# Patient Record
Sex: MALE | Race: WHITE | Employment: FULL TIME | URBAN - METROPOLITAN AREA
[De-identification: names, ages, dates, MRNs, and addresses within clinical notes are randomized per-mention and may not be internally consistent; named-entity substitution may affect disease eponyms.]

---

## 2018-06-18 ENCOUNTER — HOSPITAL ENCOUNTER (EMERGENCY)
Facility: CLINIC | Age: 38
Discharge: HOME | End: 2018-06-18
Attending: FAMILY MEDICINE | Admitting: FAMILY MEDICINE
Payer: COMMERCIAL

## 2018-06-18 VITALS
OXYGEN SATURATION: 100 % | BODY MASS INDEX: 23.62 KG/M2 | DIASTOLIC BLOOD PRESSURE: 87 MMHG | WEIGHT: 190 LBS | SYSTOLIC BLOOD PRESSURE: 146 MMHG | RESPIRATION RATE: 18 BRPM | HEIGHT: 75 IN | TEMPERATURE: 98.7 F | HEART RATE: 82 BPM

## 2018-06-18 DIAGNOSIS — H53.419 VISUAL FIELD SCOTOMA, UNSPECIFIED LATERALITY: ICD-10-CM

## 2018-06-18 PROCEDURE — 99284 EMERGENCY DEPT VISIT MOD MDM: CPT | Mod: Z6 | Performed by: FAMILY MEDICINE

## 2018-06-18 PROCEDURE — 99282 EMERGENCY DEPT VISIT SF MDM: CPT | Performed by: FAMILY MEDICINE

## 2018-06-18 NOTE — ED AVS SNAPSHOT
[facility] Emergency Department    [address] [address]    Phone:  [phone]    Fax:  [phone]                                       Jeffery Wood   MRN: [id number]    Department:  [facility] Emergency Department   Date of Visit:  6/18/2018           After Visit Summary Signature Page     I have received my discharge instructions, and my questions have been answered. I have discussed any challenges I see with this plan with the nurse or doctor.    ..........................................................................................................................................  Patient/Patient Representative Signature      ..........................................................................................................................................  Patient Representative Print Name and Relationship to Patient    ..................................................               ................................................  Date                                            Time    ..........................................................................................................................................  Reviewed by Signature/Title    ...................................................              ..............................................  Date                                                            Time

## 2018-06-18 NOTE — ED PROVIDER NOTES
"  History     Chief Complaint   Patient presents with     Eye Problem     pt reports episode of blurred vision L periphery and R inside eye for 30 minutes x 3 days ago, spontaneously resolved.  he denies HA afterwards and denies h/o migraines.  he has been feeling lightheaded since.     HPI  Jeffery Wood is a 37 year old male who presents with an episode of visual field scotoma.  He says that \"I had an aura on Friday.\"  This was 3 days ago.  He shows me a video from the Internet showing visual field scotoma and scintillating scotoma in the right visual field.  He said this lasted about 30 minutes.  After it started within 10 minutes he took some Advil.  It persisted for another 20 minutes.  He never developed a headache.  Since then he has been feeling more fatigued.  He says he has a history of anxiety and has difficulty talking about it and so he is not certain if his fatigue is anxiety related.  He has had some chronic sinus pressure for about the past 2 months since he quit smoking and he had been taking some Tylenol severe sinus medication several months ago but none recently.  He is not taking any current medications.  He is no longer having purulent nasal discharge.  He has tried nasal irrigations.  He has no history of migraine but he does have a history of migraine in the family and his mother.  Says his father has a history of auras.  Aside from anxiety he has not had any significant chronic medical problems.  During the visual symptoms and subsequently he is never had any focal neurologic symptoms of weakness or numbness in the upper or lower extremities or difficulty with his speech, swallowing, or loss of vision.  He has had no incoordination.  He has had no headache at any time.  No nausea or vomiting.  In addition he says    He has difficulty managing his anxiety and asks what the best options are.  I recommended exercise but he said that when he tries to exercise the heavy breathing and increased " "heart rate caused him to have panic attacks.  He is not interested in medication therapy for it.  In particular he does not want to take a benzodiazepine because of the risk for abuse and dependence.    Problem List:    There are no active problems to display for this patient.       Past Medical History:    No past medical history on file.    Past Surgical History:    No past surgical history on file.    Family History:    No family history on file.    Social History:  Marital Status:  Single [1]  Social History   Substance Use Topics     Smoking status: Current Every Day Smoker     Packs/day: 2.00     Years: 7.00     Types: Cigarettes     Smokeless tobacco: Never Used     Alcohol use No        Medications:      LORazepam (ATIVAN) 1 MG tablet         Review of Systems  All other systems are reviewed and are negative    Physical Exam   BP: 146/87  Pulse: 82  Temp: 98.7  F (37.1  C)  Resp: 18  Height: 190.5 cm (6' 3\")  Weight: 86.2 kg (190 lb)  SpO2: 100 %      Physical Exam    Nursing note and vitals were reviewed.  Constitutional: Awake and alert, adequately nourished and developed appearing 37-year-old in no apparent discomfort, who does not appear acutely ill, and who answers questions appropriately and cooperates with examination.  HEENT: PERRLA.  EOMI.   Neck: Freely mobile.  Pulmonary/Chest: Breathing is unlabored.    Abdomen: Soft, nontender, no HSM or masses rebound or guarding.  Musculoskeletal: Extremities are warm and well-perfused and without edema  Neurological: Alert, oriented, thought content logical, coherent.  Cranial nerve testing is normal.  Motor strength is intact in the upper and lower extremities.  Cerebellar testing is normal.  Motor tone is normal.   Skin: Warm, dry, no rashes.  Psychiatric: Affect broad and appropriate.      ED Course     ED Course     Procedures               Critical Care time:  none               No results found for this or any previous visit (from the past 24 " hour(s)).    Medications - No data to display    Assessments & Plan (with Medical Decision Making)     37-year-old male presents with an episode 3 days ago of scintillating scotoma in the visual field.  This lasted about 30 minutes and resolved.  He had taken Advil early in the course of this.  He never developed a headache.  At no time has he had any other neurologic symptoms.  As a result I have low suspicion that this is due to a serious intracranial process and I do not feel he needs advanced imaging at this time.  I suspect this was a migraine aura and he aborted the headache by taking the Advil.  He does have a family history of migraine.  I offered him the option of MRI scanning of the brain but I think it will be unrevealing and he is comfortable deferring on this.  I have advised him to return if he has recurrent symptoms particularly associated with focal neurologic symptoms.    With regard to his chronic anxiety I suggested he establish care in primary care but I think he should consider cardiac rehab to allow him to exercise in a controlled environment so that he can overcome the panic attacks that have been associated with attempts to exercise in the past.  He will schedule follow-up in primary care to discuss this.  He can also talk about medication options but he is reluctant to undertake this and I understand that.    I have reviewed the nursing notes.    I have reviewed the findings, diagnosis, plan and need for follow up with the patient.       Discharge Medication List as of 6/18/2018 11:56 AM          Final diagnoses:   Visual field scotoma, unspecified laterality       6/18/2018   [facility] EMERGENCY DEPARTMENT     Bernardo Alva MD  06/18/18 [id number]

## 2018-06-18 NOTE — DISCHARGE INSTRUCTIONS
I suspect that this was a migraine aura.  You should return to be seen if you have symptoms recur associated with weakness or numbness in an arm or leg, loss of vision, clumsiness, severe headache, or other new concerning symptoms.  For the anxiety, I would recommend following up in primary care and considering cardiac rehab.

## 2018-06-18 NOTE — ED AVS SNAPSHOT
[facility] Emergency Department    [address] [address]    Phone:  [phone]    Fax:  [phone]                                       Jeffery Wood   MRN: [id number]    Department:  [facility] Emergency Department   Date of Visit:  6/18/2018           Patient Information     Date Of Birth          1980        Your diagnoses for this visit were:     Visual field scotoma, unspecified laterality        You were seen by Bernardo Alva MD.        Discharge Instructions       I suspect that this was a migraine aura.  You should return to be seen if you have symptoms recur associated with weakness or numbness in an arm or leg, loss of vision, clumsiness, severe headache, or other new concerning symptoms.  For the anxiety, I would recommend following up in primary care and considering cardiac rehab.    24 Hour Appointment Hotline       To make an appointment at any [facility] clinic, call [phone] ([phone]). If you don't have a family doctor or clinic, we will help you find one. [facility] clinics are conveniently located to serve the needs of you and your family.             Review of your medicines      Our records show that you are taking the medicines listed below. If these are incorrect, please call your family doctor or clinic.        Dose / Directions Last dose taken    LORazepam 1 MG tablet   Commonly known as:  ATIVAN   Dose:  0.5-1 mg   Quantity:  4 tablet        Take 0.5-1 tablets (0.5-1 mg) by mouth every 8 hours as needed for anxiety   Refills:  0                Orders Needing Specimen Collection     None      Pending Results     No orders found from 6/16/2018 to 6/19/2018.            Pending Culture Results     No orders found from 6/16/2018 to 6/19/2018.            Pending Results Instructions     If you had any lab results that were not finalized at the time of your Discharge, you can call the ED Lab Result RN at [phone]. You will be contacted by  "this team for any positive Lab results or changes in treatment. The nurses are available 7 days a week from 10A to 6:30P.  You can leave a message 24 hours per day and they will return your call.        Test Results From Your Hospital Stay               Thank you for choosing [facility]       Thank you for choosing [facility] for your care. Our goal is always to provide you with excellent care. Hearing back from our patients is one way we can continue to improve our services. Please take a few minutes to complete the written survey that you may receive in the mail after you visit with us. Thank you!        NTQ-DataharAWAK Information     Voyando lets you send messages to your doctor, view your test results, renew your prescriptions, schedule appointments and more. To sign up, go to [email or web address] . Click on \"Log in\" on the left side of the screen, which will take you to the Welcome page. Then click on \"Sign up Now\" on the right side of the page.     You will be asked to enter the access code listed below, as well as some personal information. Please follow the directions to create your username and password.     Your access code is: [email or web address]  Expires: 2018 11:56 AM     Your access code will  in 90 days. If you need help or a new code, please call your [facility] clinic or [phone].        Care EveryWhere ID     This is your Care EveryWhere ID. This could be used by other organizations to access your [facility] medical records  [id number]        Equal Access to Services     MARCEL LONG AH: Hadii stephanie Srinivasan, waaxda luqadaha, qaybta kaalmada margie, jose cruz. So [facility] [phone].    ATENCIÓN: Si habla español, tiene a nowak disposición servicios gratuitos de asistencia lingüística. Viviana al [phone].    We comply with applicable federal civil rights laws and [place] laws. We do not discriminate on the basis of race, color, national origin, age, " disability, sex, sexual orientation, or gender identity.            After Visit Summary       This is your record. Keep this with you and show to your community pharmacist(s) and doctor(s) at your next visit.

## 2018-06-18 NOTE — ED NOTES
"Pt was working on Friday and pt has spots in both eyes lasting approximately 30 minutes, pt denied headache.  Pt reports anxiety during episode and worked rest of day.  Pt took ibuprofen.  Saturday, pt felt tired and \"off\"   Sunday pt continued to feel tired and off.   Pt went to work today and pt continues to feel \" off\"   Pt reports feeling worried about episode \"it could just be anxiety\"   Pt does not take anything for anxiety at this time.    "

## 2018-06-19 ENCOUNTER — OFFICE VISIT (OUTPATIENT)
Dept: FAMILY MEDICINE | Facility: CLINIC | Age: 38
End: 2018-06-19
Payer: COMMERCIAL

## 2018-06-19 VITALS
BODY MASS INDEX: 23.87 KG/M2 | TEMPERATURE: 99.2 F | DIASTOLIC BLOOD PRESSURE: 82 MMHG | HEART RATE: 76 BPM | HEIGHT: 75 IN | WEIGHT: 192 LBS | SYSTOLIC BLOOD PRESSURE: 120 MMHG

## 2018-06-19 DIAGNOSIS — Z13.220 LIPID SCREENING: ICD-10-CM

## 2018-06-19 DIAGNOSIS — J01.01 ACUTE RECURRENT MAXILLARY SINUSITIS: Primary | ICD-10-CM

## 2018-06-19 DIAGNOSIS — Z09 FOLLOW-UP FOR RESOLVED CONDITION: ICD-10-CM

## 2018-06-19 PROCEDURE — 99203 OFFICE O/P NEW LOW 30 MIN: CPT | Performed by: NURSE PRACTITIONER

## 2018-06-19 RX ORDER — FLUTICASONE PROPIONATE 50 MCG
1-2 SPRAY, SUSPENSION (ML) NASAL DAILY
Qty: 1 BOTTLE | Refills: 11 | Status: SHIPPED | OUTPATIENT
Start: 2018-06-19

## 2018-06-19 NOTE — MR AVS SNAPSHOT
"              After Visit Summary   6/19/2018    Jeffery Wood    MRN: [id number]           Patient Information     Date Of Birth          1980        Visit Information        Provider Department      6/19/2018 4:20 PM Jenise Dill APRN CNP [facility]        Today's Diagnoses     Lipid screening    -  1    Acute recurrent maxillary sinusitis          Care Instructions    Schedule a formal eye exam    Start Flonase for allergy symptoms    Schedule a lab only appointment for fasting labs    Follow up if symptoms do not improve or worsen.            Follow-ups after your visit        Future tests that were ordered for you today     Open Future Orders        Priority Expected Expires Ordered    Lipid panel reflex to direct LDL Fasting Routine 6/19/2018 6/19/2019 6/19/2018    **Glucose FUTURE anytime Routine 6/19/2018 6/19/2019 6/19/2018            Who to contact     If you have questions or need follow up information about today's clinic visit or your schedule please contact [facility] directly at [phone].  Normal or non-critical lab and imaging results will be communicated to you by Greetzhart, letter or phone within 4 business days after the clinic has received the results. If you do not hear from us within 7 days, please contact the clinic through MediaWorkst or phone. If you have a critical or abnormal lab result, we will notify you by phone as soon as possible.  Submit refill requests through RadioFrame or call your pharmacy and they will forward the refill request to us. Please allow 3 business days for your refill to be completed.          Additional Information About Your Visit        Greetzhart Information     RadioFrame lets you send messages to your doctor, view your test results, renew your prescriptions, schedule appointments and more. To sign up, go to [email or web address] . Click on \"Log in\" on the left side of the screen, which will take you to the Welcome " "page. Then click on \"Sign up Now\" on the right side of the page.     You will be asked to enter the access code listed below, as well as some personal information. Please follow the directions to create your username and password.     Your access code is: [email or web address]  Expires: 2018 11:56 AM     Your access code will  in 90 days. If you need help or a new code, please call your [facility] clinic or [phone].        Care EveryWhere ID     This is your Care EveryWhere ID. This could be used by other organizations to access your [facility] medical records  [id number]        Your Vitals Were     Pulse Temperature Height BMI (Body Mass Index)          76 99.2  F (37.3  C) (Tympanic) 6' 3\" (1.905 m) 24 kg/m2         Blood Pressure from Last 3 Encounters:   18 120/82   18 146/87   12/26/15 142/78    Weight from Last 3 Encounters:   18 192 lb (87.1 kg)   18 190 lb (86.2 kg)   05/15/06 194 lb 9.6 oz (88.3 kg)                 Today's Medication Changes          These changes are accurate as of 18  5:05 PM.  If you have any questions, ask your nurse or doctor.               Start taking these medicines.        Dose/Directions    fluticasone 50 MCG/ACT spray   Commonly known as:  FLONASE   Used for:  Acute recurrent maxillary sinusitis   Started by:  Jenise Dill APRN CNP        Dose:  1-2 spray   Spray 1-2 sprays into both nostrils daily   Quantity:  1 Bottle   Refills:  11         Stop taking these medicines if you haven't already. Please contact your care team if you have questions.     LORazepam 1 MG tablet   Commonly known as:  ATIVAN   Stopped by:  Jenise Dill APRN CNP                Where to get your medicines      These medications were sent to [id number] [place], [place] - [address]  [address] [address]    Hours:  Closed 10-16-08 business to [facility] Phone:  [phone]     fluticasone 50 MCG/ACT spray          "       Primary Care Provider Office Phone # Fax #    EMI Klein [phone] [address] [place] [address]        Equal Access to Services     MARCEL LONG : Hadii aad ku hadanao Soradhaali, waaxda luqadaha, qaybta kaalmada adechrissieda, jose cook ab cruz. So [facility] [phone].    ATENCIÓN: Si habla español, tiene a nowak disposición servicios gratuitos de asistencia lingüística. Llame al [phone].    We comply with applicable federal civil rights laws and [place] laws. We do not discriminate on the basis of race, color, national origin, age, disability, sex, sexual orientation, or gender identity.            Thank you!     Thank you for choosing [facility]  for your care. Our goal is always to provide you with excellent care. Hearing back from our patients is one way we can continue to improve our services. Please take a few minutes to complete the written survey that you may receive in the mail after your visit with us. Thank you!             Your Updated Medication List - Protect others around you: Learn how to safely use, store and throw away your medicines at www.disposemymeds.org.          This list is accurate as of 6/19/18  5:05 PM.  Always use your most recent med list.                   Brand Name Dispense Instructions for use Diagnosis    fluticasone 50 MCG/ACT spray    FLONASE    1 Bottle    Spray 1-2 sprays into both nostrils daily    Acute recurrent maxillary sinusitis

## 2018-06-19 NOTE — PATIENT INSTRUCTIONS
Schedule a formal eye exam    Start Flonase for allergy symptoms    Schedule a lab only appointment for fasting labs    Follow up if symptoms do not improve or worsen.

## 2018-06-19 NOTE — PROGRESS NOTES
"  SUBJECTIVE:   Jeffery Wood is a 37 year old male who presents to clinic today for the following health issues:    ED/UC Followup:    Facility:  [facility]   Date of visit: 6/18/2018  Reason for visit: Visual field scotoma   Current Status: Pt states he has not had anymore episodes of visual disturbance. He would like to establish care with primary incase this happens again.    Pt also sates he as had sinus pressure for about 2 months as well.      No similar symptoms since ED visit and nothing similar prior  History of anxiety and panic but symptoms different than those episodes  Sinus symptoms ongoing for 2 months but does not feel sick  Recent allergies making symptoms worse.    Problem list and histories reviewed & adjusted, as indicated.  Additional history: as documented    There is no problem list on file for this patient.    History reviewed. No pertinent surgical history.    Social History   Substance Use Topics     Smoking status: Current Every Day Smoker     Packs/day: 2.00     Years: 7.00     Types: Cigarettes     Smokeless tobacco: Never Used      Comment: e-cig     Alcohol use No     Family History   Problem Relation Age of Onset     Migraines Mother      Migraines Sister          Current Outpatient Prescriptions   Medication Sig Dispense Refill     fluticasone (FLONASE) 50 MCG/ACT spray Spray 1-2 sprays into both nostrils daily 1 Bottle 11     No Known Allergies  Labs reviewed in EPIC    Reviewed and updated as needed this visit by clinical staff  Tobacco  Allergies  Meds  Med Hx  Surg Hx  Fam Hx  Soc Hx      Reviewed and updated as needed this visit by Provider         ROS:  Constitutional, HEENT, cardiovascular, pulmonary, gi and gu systems are negative, except as otherwise noted.    OBJECTIVE:     /82 (Cuff Size: Adult Regular)  Pulse 76  Temp 99.2  F (37.3  C) (Tympanic)  Ht 6' 3\" (1.905 m)  Wt 192 lb (87.1 kg)  BMI 24 kg/m2  Body mass index is 24 kg/(m^2).  GENERAL: healthy, alert " and no distress  HENT: normal cephalic/atraumatic, both ears: clear effusion, nose and mouth without ulcers or lesions, oropharynx clear and oral mucous membranes moist  NECK: no adenopathy, no asymmetry, masses, or scars and thyroid normal to palpation  RESP: lungs clear to auscultation - no rales, rhonchi or wheezes  CV: regular rate and rhythm, normal S1 S2, no S3 or S4, no murmur, click or rub, no peripheral edema and peripheral pulses strong  ABDOMEN: soft, nontender, no hepatosplenomegaly, no masses and bowel sounds normal  NEURO: Normal strength and tone, sensory exam grossly normal, mentation intact and cranial nerves 2-12 intact  PSYCH: mentation appears normal, affect normal/bright    Diagnostic Test Results:  none     ASSESSMENT/PLAN:     1. Acute recurrent maxillary sinusitis  With ongoing sinus symptoms will start Flonase.  Follow up if not improving or any worsening symptoms.  - fluticasone (FLONASE) 50 MCG/ACT spray; Spray 1-2 sprays into both nostrils daily  Dispense: 1 Bottle; Refill: 11    2. Follow-up for resolved condition  No reoccurrence of visual symptoms.  Monitor and follow up if symptoms return.  Schedule a formal eye exam.    3. Lipid screening    - Lipid panel reflex to direct LDL Fasting; Future  - **Glucose FUTURE anytime; Future    Home care instructions were reviewed with the patient. The risks, benefits and treatment options of prescribed medications or other treatments have been discussed with the patient. The patient verbalized their understanding and should call or follow up if no improvement or if they develop further problems.    Patient Instructions   Schedule a formal eye exam    Start Flonase for allergy symptoms    Schedule a lab only appointment for fasting labs    Follow up if symptoms do not improve or worsen.        EMI Rodrigez [facility]

## 2018-06-24 DIAGNOSIS — Z13.220 LIPID SCREENING: ICD-10-CM

## 2018-06-24 LAB
CHOLEST SERPL-MCNC: 174 MG/DL
GLUCOSE SERPL-MCNC: 107 MG/DL (ref 70–99)
HDLC SERPL-MCNC: 42 MG/DL
LDLC SERPL CALC-MCNC: 117 MG/DL
NONHDLC SERPL-MCNC: 132 MG/DL
TRIGL SERPL-MCNC: 77 MG/DL

## 2018-06-24 PROCEDURE — 80061 LIPID PANEL: CPT | Performed by: NURSE PRACTITIONER

## 2018-06-24 PROCEDURE — 82947 ASSAY GLUCOSE BLOOD QUANT: CPT | Performed by: NURSE PRACTITIONER

## 2018-06-24 PROCEDURE — 36415 COLL VENOUS BLD VENIPUNCTURE: CPT | Performed by: NURSE PRACTITIONER

## 2018-06-25 ENCOUNTER — TELEPHONE (OUTPATIENT)
Dept: FAMILY MEDICINE | Facility: CLINIC | Age: 38
End: 2018-06-25

## 2018-06-25 NOTE — TELEPHONE ENCOUNTER
Reason for Call:  Other call back    Detailed comments: Patient calling looking for lab results from 6-24-18. Results have been viewed, but not commented on by Tammy Dill. Please call patient back with results.    Phone Number Patient can be reached at: Cell number on file:    Telephone Information:   Mobile [phone]       Best Time: any    Can we leave a detailed message on this number? YES    Call taken on 6/25/2018 at 4:20 PM by Annemarie Dawson

## 2020-03-16 ENCOUNTER — HOSPITAL ENCOUNTER (EMERGENCY)
Facility: CLINIC | Age: 40
Discharge: HOME | End: 2020-03-16
Attending: PHYSICIAN ASSISTANT | Admitting: PHYSICIAN ASSISTANT
Payer: COMMERCIAL

## 2020-03-16 VITALS
DIASTOLIC BLOOD PRESSURE: 90 MMHG | BODY MASS INDEX: 23.75 KG/M2 | TEMPERATURE: 98.1 F | WEIGHT: 190 LBS | OXYGEN SATURATION: 100 % | SYSTOLIC BLOOD PRESSURE: 153 MMHG

## 2020-03-16 DIAGNOSIS — R19.7 DIARRHEA: ICD-10-CM

## 2020-03-16 DIAGNOSIS — R11.0 NAUSEA: ICD-10-CM

## 2020-03-16 LAB
INTERNAL QC OK POCT: YES
S PYO AG THROAT QL IA.RAPID: NEGATIVE
SPECIMEN SOURCE: NORMAL
STREP GROUP A PCR: NOT DETECTED

## 2020-03-16 PROCEDURE — 87651 STREP A DNA AMP PROBE: CPT | Performed by: NURSE PRACTITIONER

## 2020-03-16 PROCEDURE — 87880 STREP A ASSAY W/OPTIC: CPT | Performed by: PHYSICIAN ASSISTANT

## 2020-03-16 PROCEDURE — 99213 OFFICE O/P EST LOW 20 MIN: CPT | Mod: Z6 | Performed by: PHYSICIAN ASSISTANT

## 2020-03-16 PROCEDURE — G0463 HOSPITAL OUTPT CLINIC VISIT: HCPCS | Performed by: PHYSICIAN ASSISTANT

## 2020-03-16 NOTE — ED AVS SNAPSHOT
[facility] Emergency Department  [address] [address]  Phone:  [phone]  Fax:  [phone]                                    Jeffery Wood   MRN: [id number]    Department:  [facility] Emergency Department   Date of Visit:  3/16/2020           After Visit Summary Signature Page    I have received my discharge instructions, and my questions have been answered. I have discussed any challenges I see with this plan with the nurse or doctor.    ..........................................................................................................................................  Patient/Patient Representative Signature      ..........................................................................................................................................  Patient Representative Print Name and Relationship to Patient    ..................................................               ................................................  Date                                   Time    ..........................................................................................................................................  Reviewed by Signature/Title    ...................................................              ..............................................  Date                                               Time          22EPIC Rev 08/18

## 2020-03-17 NOTE — ED PROVIDER NOTES
History     Chief Complaint   Patient presents with     Flu Symptoms     HPI  Jeffery Wood is a 39 year old male who presents the urgent care with several household contacts requesting testing to rule out strep throat.  For the last 2 days patient is complained of headache, nausea, possible loose stools.  He denies any significant sore throat, fever, chills, nasal congestion, cough, dyspnea, wheezing, vomiting, abdominal pain.  He did attempt to treat with ibuprofen initially however nothing today.  He does have a household contact who has similar GI symptoms.  He denies any suspected bad food exposures.  No recent travel. No recent antibiotic use.     Allergies:  No Known Allergies    Problem List:    There are no active problems to display for this patient.     Past Medical History:    History reviewed. No pertinent past medical history.    Past Surgical History:    History reviewed. No pertinent surgical history.    Family History:    Family History   Problem Relation Age of Onset     Migraines Mother      Migraines Sister      Social History:  Marital Status:  Single [1]  Social History     Tobacco Use     Smoking status: Current Every Day Smoker     Packs/day: 2.00     Years: 7.00     Pack years: 14.00     Types: Cigarettes     Smokeless tobacco: Never Used     Tobacco comment: e-cig   Substance Use Topics     Alcohol use: No     Drug use: No        Medications:    fluticasone (FLONASE) 50 MCG/ACT spray      Review of Systems  CONSTITUTIONAL:NEGATIVE for fever, chills, change in weight  INTEGUMENTARY/SKIN: NEGATIVE for worrisome rashes, moles or lesions  EYES: NEGATIVE for vision changes or irritation  ENT/MOUTH: NEGATIVE for ear, mouth and throat problems  RESP:NEGATIVE for significant cough or SOB  GI: POSITIVE for nausea, loose stool and NEGATIVE for vomiting, abdominal pain   Physical Exam   BP: (!) 153/90  Heart Rate: 82  Temp: 98.1  F (36.7  C)  Weight: 86.2 kg (190 lb)  SpO2: 100 %  Physical  Exam  GENERAL APPEARANCE: healthy, alert and no distress  EYES: EOMI,  PERRL, conjunctiva clear  HENT: ear canals and TM's normal.  Nose and mouth without ulcers, erythema or lesions  NECK: supple, nontender, no lymphadenopathy  RESP: lungs clear to auscultation - no rales, rhonchi or wheezes  CV: regular rates and rhythm, normal S1 S2, no murmur noted  ABDOMEN:  soft, nontender, no HSM or masses and bowel sounds normal  SKIN: no suspicious lesions or rashes  ED Course        Procedures        Critical Care time:  none        Results for orders placed or performed during the hospital encounter of 03/16/20 (from the past 24 hour(s))   Rapid strep group A screen POCT   Result Value Ref Range    Rapid Strep A Screen negative neg    Internal QC OK Yes    Group A Streptococcus PCR Throat Swab    Specimen: Throat   Result Value Ref Range    Specimen Description Throat     Strep Group A PCR Not Detected NDET^Not Detected     Medications - No data to display    Assessments & Plan (with Medical Decision Making)     I have reviewed the nursing notes.  I have reviewed the findings, diagnosis, plan and need for follow up with the patient.       Discharge Medication List as of 3/16/2020  7:53 PM        Final diagnoses:   Nausea   Diarrhea     39-year-old male presents to the urgent care requesting testing for strep throat given 2-day history of headache, nausea and single loose stool.  He had elevated blood pressure upon arrival, remainder of vital signs within normal limits.  Physical exam findings as described above are benign.  Patient did have negative rapid strep test with culture pending at time of discharge.  Given close contacts with similar GI symptoms suspect viral mediated illness.  I do not suspect foodborne illness.  He had non-surgical abdominal exam.  He was discharged home stable with instructions for symptomatic treatment.  Follow up with PCP if no improvement in 5-7 days . Worrisome reasons to return to ER/UC  sooner discussed.     Disclaimer: This note consists of symbols derived from keyboarding, dictation, and/or voice recognition software. As a result, there may be errors in the script that have gone undetected.  Please consider this when interpreting information found in the chart.      3/16/2020   [facility] EMERGENCY DEPARTMENT     Celina Mendoza PA-C  03/17/20 [id number]

## 2020-03-17 NOTE — RESULT ENCOUNTER NOTE
Group A Streptococcus PCR is NEGATIVE   No treatment or change in treatment [facility] ED lab result protocol - Strep protocol.

## 2021-12-29 ENCOUNTER — OFFICE VISIT (OUTPATIENT)
Dept: URGENT CARE | Facility: URGENT CARE | Age: 41
End: 2021-12-29
Payer: COMMERCIAL

## 2021-12-29 VITALS
HEART RATE: 83 BPM | SYSTOLIC BLOOD PRESSURE: 134 MMHG | OXYGEN SATURATION: 99 % | TEMPERATURE: 99 F | DIASTOLIC BLOOD PRESSURE: 82 MMHG | RESPIRATION RATE: 16 BRPM

## 2021-12-29 DIAGNOSIS — J02.9 VIRAL PHARYNGITIS: Primary | ICD-10-CM

## 2021-12-29 DIAGNOSIS — R07.0 THROAT PAIN: ICD-10-CM

## 2021-12-29 LAB — DEPRECATED S PYO AG THROAT QL EIA: NEGATIVE

## 2021-12-29 PROCEDURE — U0005 INFEC AGEN DETEC AMPLI PROBE: HCPCS | Performed by: NURSE PRACTITIONER

## 2021-12-29 PROCEDURE — U0003 INFECTIOUS AGENT DETECTION BY NUCLEIC ACID (DNA OR RNA); SEVERE ACUTE RESPIRATORY SYNDROME CORONAVIRUS 2 (SARS-COV-2) (CORONAVIRUS DISEASE [COVID-19]), AMPLIFIED PROBE TECHNIQUE, MAKING USE OF HIGH THROUGHPUT TECHNOLOGIES AS DESCRIBED BY CMS-2020-01-R: HCPCS | Performed by: NURSE PRACTITIONER

## 2021-12-29 PROCEDURE — 87651 STREP A DNA AMP PROBE: CPT | Performed by: NURSE PRACTITIONER

## 2021-12-29 PROCEDURE — 99213 OFFICE O/P EST LOW 20 MIN: CPT | Performed by: NURSE PRACTITIONER

## 2021-12-29 NOTE — PROGRESS NOTES
Assessment & Plan     Viral pharyngitis  Rapid strep test is negative.  Culture is pending and patient will be notified if positive for treatment.  COVID-19 testing completed due to symptoms for rule out.  Influenza testing not completed at this time since symptoms are 4 days out and would not change treatment plan.  Patient received information on after your Covid testing and sore throat management for symptoms.  Recommend follow-up if any worsening or persistent symptoms in 1 week.    Throat pain  - Streptococcus A Rapid Screen w/Reflex to PCR - Clinic Collect  - Symptomatic; Yes; 12/26/2021 COVID-19 Virus (Coronavirus) by PCR Nose  - Group A Streptococcus PCR Throat Swab    See Patient Instructions    Return in about 1 week (around 1/5/2022), or if symptoms worsen or fail to improve.    Loida Ellsworth NP on 12/29/2021 at 5:05 PM  [facility]    Kym Gil is a 41 year old who presents for the following health issues     HPI     Chief Complaint   Patient presents with     Pharyngitis     x 4 days, low grade fever, body aches, fatigue     URI Adult    Onset of symptoms was 4 day(s) ago.  Course of illness is worsening.    Severity moderate  Current and Associated symptoms: fever, cough - productive, sore throat, headache, body aches and fatigue  Treatment measures tried include Nyquil before bed; cough drops.  Predisposing factors include ill contact: Family member son has same symptoms.  No vaccinations this year for influenza or COVID    Review of Systems   CONSTITUTIONAL: NEGATIVE for fever, chills, change in weight  ENT/MOUTH: POSITIVE for sore throat  RESP:POSITIVE for cough-productive  CV: NEGATIVE for chest pain, palpitations or peripheral edema  PSYCHIATRIC: NEGATIVE for changes in mood or affect  ROS otherwise negative      Objective    /82 (BP Location: Right arm, Patient Position: Sitting, Cuff Size: Adult Large)   Pulse 83   Temp 99  F (37.2  C)  (Tympanic)   Resp 16   SpO2 99%   There is no height or weight on file to calculate BMI.  Physical Exam   GENERAL: healthy, alert and no distress  EYES: Eyes grossly normal to inspection, PERRL and conjunctivae and sclerae normal  HENT: ear canals and TM's normal, nose and mouth without ulcers or lesions  NECK: no adenopathy and no asymmetry, masses, or scars  RESP: lungs clear to auscultation - no rales, rhonchi or wheezes  CV: regular rate and rhythm, normal S1 S2, no S3 or S4, no murmur, click or rub, no peripheral edema and peripheral pulses strong  PSYCH: mentation appears normal, affect normal/bright    Results for orders placed or performed in visit on 12/29/21 (from the past 24 hour(s))   Streptococcus A Rapid Screen w/Reflex to PCR - Clinic Collect    Specimen: Throat; Swab   Result Value Ref Range    Group A Strep antigen Negative Negative

## 2021-12-29 NOTE — PATIENT INSTRUCTIONS
"1.  This is most likely a viral upper respiratory infection with sore throat.    1.  I recommend pushing fluids and using tylenol as needed for headaches.  2.  You can take Vitamin D 5000 international unit(s) daily, Vitamin C 500 mg twice daily, Green Tea Extract 250-500 mg twice daily, Zinc  mg daily, and melatonin up to 3 mg per day can be helpful to you when fighting COVID 19.  3.  Follow-up in ER if any severe breathing issues.  4.  Follow-up in clinic if any persistent symptoms that are not improving for recheck in 1 week.  5.  I will notify you by phone of results when they are back.  This can take 1-3 days.  6.  Handout below on guidelines for after your COVID 19 testing.    After Your COVID-19 (Coronavirus) Test  You have been tested for COVID-19 (coronavirus).   If you'll have surgery in the next few days, we'll let you know ahead of time if you have the virus. Please call your surgeon's office with any questions.  For all other patients: Results are usually available in [employer] within 2 to 3 days.   If you do not have a [employer] account, you'll get a letter in the mail in about 7 to 10 days.   [facility] is often the fastest way to get test results. Please sign up if you do not already have a [employer] account. See the handout Getting COVID-19 Test Results in [employer] for help.  What if my test result is positive?  If your test is positive and you have not viewed your result in [employer], you'll get a phone call with your result. (A positive test means that you have the virus.)     Follow the tips under \"How do I self-isolate?\" below for 10 days (20 days if you have a weak immune system).    You don't need to be retested for COVID-19 before going back to school or work. As long as you're fever-free and feeling better, you can go back to school, work and other activities after waiting the 10 or 20 days.  What if I have questions after I get my results?  If you have questions about your results, please visit " "our testing website at [email or web address].   After 7 to 10 days, if you have not gotten your results:     Call [phone] ([phone]) and ask to speak with our COVID-19 results team.    If you're being treated at an infusion center: Call your infusion center directly.  What are the symptoms of COVID-19?  Cough, fever and trouble breathing are the most common signs of COVID-19.  Other symptoms can include new headaches, new muscle or body aches, new and unexplained fatigue (feeling very tired), chills, sore throat, congestion (stuffy or runny nose), diarrhea (loose poop), loss of taste or smell, belly pain, and nausea or vomiting (feeling sick to your stomach or throwing up).  You may already have symptoms of COVID-19, or they may show up later.  What should I do if I have symptoms?  If you're having surgery: Call your surgeon's office.  For all other patients: Stay home and away from others (self-isolate) until ...    You've had no fever--and no medicine that reduces fever--for 1 full day (24 hours), AND    Other symptoms have gotten better. For example, your cough or breathing has improved, AND    At least 10 days have passed since your symptoms first started.  How do I self-isolate?  1. Stay in your own room, even for meals. Use your own bathroom if you can.  2. Stay away from others in your home. No hugging, kissing or shaking hands. No visitors.  3. Don't go to work, school or anywhere else.  4. Clean \"high touch\" surfaces often (doorknobs, counters, handles). Use household cleaning spray or wipes. You'll find a full list of  on the EPA website: www.epa.gov/pesticide-registration/list-n-disinfectants-use-against-sars-cov-2.  5. Cover your mouth and nose with a mask or other face covering to avoid spreading germs.  6. Wash your hands and face often. Use soap and water.  7. Caregivers in these groups are at risk for severe illness due to COVID-19:  1. People 65 " years and older  2. People who live in a nursing home or long-term care facility  3. People with chronic disease (lung, heart, cancer, diabetes, kidney, liver, immunologic)  4. People who have a weakened immune system, including those who:    Are in cancer treatment    Take medicine that weakens the immune system, such as corticosteroids    Had a bone marrow or organ transplant    Have an immune deficiency    Have poorly controlled HIV or AIDS    Are obese (body mass index of 40 or higher)    Smoke regularly  8. Caregivers should wear gloves while washing dishes, handling laundry and cleaning bedrooms and bathrooms.  9. Use caution when washing and drying laundry: Don't shake dirty laundry and use the warmest water setting that you can.  10. For more tips on managing your health at home, go to www.cdc.gov/coronavirus/2019-ncov/downloads/10Things.pdf.  How can I take care of myself at home?  1. Get lots of rest. Drink extra fluids (unless a doctor has told you not to).  2. Take Tylenol (acetaminophen) for fever or pain. If you have liver or kidney problems, ask your family doctor if it's OK to take Tylenol.   Adults can take either:  ? 650 mg (two 325 mg pills) every 4 to 6 hours, or   ? 1,000 mg (two 500 mg pills) every 8 hours as needed.  ? Note: Don't take more than 3,000 mg in one day. Acetaminophen is found in many medicines (both prescribed and over-the-counter medicines). Read all labels to be sure you don't take too much.   For children, check the Tylenol bottle for the right dose. The dose is based on the child's age or weight.  3. If you have other health problems (like cancer, heart failure, an organ transplant or severe kidney disease): Call your specialty clinic if you don't feel better in the next 2 days.  4. Know when to call 911. Emergency warning signs include:  ? Trouble breathing or shortness of breath  ? Chest pain or pressure that doesn't go away  ? Feeling confused like you haven't felt before,  or not being able to wake up  ? Bluish-colored lips or face  5. If your doctor prescribed a blood thinner medicine: Follow their instructions.  Where can I get more information?  1. [facility] - About COVID-19:   [email or web address]  2. CDC - If You're Sick: cdc.gov/coronavirus/2019-ncov/about/steps-when-sick.html  3. CDC - Ending Home Isolation: www.cdc.gov/coronavirus/2019-ncov/hcp/disposition-in-home-patients.html  4. [facility] - Caring for Someone: www.cdc.gov/coronavirus/2019-ncov/if-you-are-sick/care-for-someone.html  5. [facility] - Interim Guidance for Hospital Discharge to Home: www.health.[facility].[place]./diseases/coronavirus/hcp/hospdischarge.pdf  6. [facility] clinical trials (COVID-19 research studies): [email or web address]-clinical-trials  7. Below are the COVID-19 hotlines at the [place] Department of Health ([facility]). Interpreters are available.  ? For health questions: Call 166-850-0759 or 1-309.394.2945 (7 a.m. to 7 p.m.)  ? For questions about schools and childcare: Call 349-107-6182 or 1-959.870.6132 (7 a.m. to 7 p.m.)    For informational purposes only. Not to replace the advice of your health care provider. Clinically reviewed by Infection Prevention and the [facility] COVID-19 Clinical Team. Copyright   2020 [employer]. All rights reserved. [employer] 211116 - Rev 11/11/20.       Patient Education     Self-Care for Sore Throats     Sore throats happen for many reasons, such as colds, allergies, cigarette smoke, air pollution, and infections caused by viruses or bacteria. In any case, your throat becomes red and sore. Your goal for self-care is to reduce your discomfort while giving your throat a chance to heal.  Moisten and soothe your throat  Tips include the following:    Try a sip of water first thing after waking up.    Keep your throat moist by drinking 6 or more glasses of clear liquids every day.    Run a cool-air humidifier in your room  overnight.    Stay away from cigarette smoke.     Check the air quality index,if air pollution gives you a sore throat. On high pollution days, try to limit outdoor time.    Suck on throat lozenges, cough drops, hard candy, ice chips, or frozen fruit-juice bars. Use the sugar-free versions if your diet or medical condition requires them.  Gargle to ease irritation  Gargling every hour or 2 can ease irritation. Try gargling with 1 of these solutions:    1/4 teaspoon of salt in 1/2 cup of warm water    An over-the-counter anesthetic gargle  Use medicine for more relief  Over-the-counter medicine can reduce sore throat symptoms. Ask your pharmacist if you have questions about which medicine to use. To prevent possible medicine interactions, let the pharmacist know what medicines you take. To decrease symptoms:    Ease pain with anesthetic sprays. Aspirin or an aspirin substitute also helps. Remember, never give aspirin to anyone 18 or younger. Don't take aspirin if you are already taking blood thinners.     For sore throats caused by allergies, try antihistamines to block the allergic reaction.  Unless a sore throat is caused by a bacterial infection, antibiotics won t help you.  Prevent future sore throats  Prevention tips include:    Stop smoking or reduce contact with secondhand smoke. Smoke irritates the tender throat lining.    Limit contact with pets and with allergy-causing substances, such as pollen and mold.    Wash your hands often when you re around someone with a sore throat or cold. This will keep viruses or bacteria from spreading.    Limit outdoor time when air pollution is bad.    Don t strain your vocal cords.  When to call your healthcare provider  Contact your healthcare provider if you have:    Fever of 100.4 F (38.0 C) or higher, or as directed by your healthcare provider    White spots on the throat    Great Trouble swallowing    A skin rash    Recent exposure to someone else with strep  bacteria    Severe hoarseness and swollen glands in the neck or jaw  Call 911  Call 911 if any of the following occur:    Trouble breathing or catching your breath    Drooling and problems swallowing    Wheezing    Unable to talk    Feeling dizzy or faint    Feeling of doom  [employer] last reviewed this educational content on 9/1/2019 2000-2021 [employer]. All rights reserved. This information is not intended as a substitute for professional medical care. Always follow your healthcare professional's instructions.

## 2021-12-29 NOTE — LETTER
December 30, 2021      Jeffery Wood  [address] [place] [id number]        Dear ,    We are writing to inform you of your test results.    Your test results fall within the expected range(s) or remain unchanged from previous results.  Please continue with current treatment plan.    Please send a letter letting patient know his strep culture is negative.   Loida Ellsworth NP on 12/30/2021 at 9:22 AM       Resulted Orders   Streptococcus A Rapid Screen w/Reflex to PCR - Clinic Collect   Result Value Ref Range    Group A Strep antigen Negative Negative   Group A Streptococcus PCR Throat Swab   Result Value Ref Range    Group A strep by PCR Not Detected Not Detected    Narrative    The Xpert Xpress Strep A test, performed on the [employer]  Instrument Systems, is a rapid, qualitative in vitro diagnostic test for the detection of Streptococcus pyogenes (Group A ß-hemolytic Streptococcus, Strep A) in throat swab specimens from patients with signs and symptoms of pharyngitis. The Xpert Xpress Strep A test can be used as an aid in the diagnosis of Group A Streptococcal pharyngitis. The assay is not intended to monitor treatment for Group A Streptococcus infections. The Xpert Xpress Strep A test utilizes an automated real-time polymerase chain reaction (PCR) to detect Streptococcus pyogenes DNA.       If you have any questions or concerns, please call the clinic at the number listed above.       Sincerely,      Loida Ellsworth NP

## 2021-12-30 LAB
GROUP A STREP BY PCR: NOT DETECTED
SARS-COV-2 RNA RESP QL NAA+PROBE: NEGATIVE

## 2025-05-03 ENCOUNTER — TELEPHONE (OUTPATIENT)
Dept: FAMILY MEDICINE | Facility: CLINIC | Age: 45
End: 2025-05-03

## 2025-07-02 ENCOUNTER — TELEPHONE (OUTPATIENT)
Dept: URGENT CARE | Facility: URGENT CARE | Age: 45
End: 2025-07-02

## 2025-07-02 ENCOUNTER — OFFICE VISIT (OUTPATIENT)
Dept: URGENT CARE | Facility: URGENT CARE | Age: 45
End: 2025-07-02
Payer: COMMERCIAL

## 2025-07-02 VITALS
TEMPERATURE: 98.7 F | DIASTOLIC BLOOD PRESSURE: 83 MMHG | RESPIRATION RATE: 14 BRPM | HEIGHT: 75 IN | HEART RATE: 88 BPM | WEIGHT: 246 LBS | BODY MASS INDEX: 30.59 KG/M2 | OXYGEN SATURATION: 99 % | SYSTOLIC BLOOD PRESSURE: 130 MMHG

## 2025-07-02 DIAGNOSIS — H16.002 CORNEAL ULCER OF LEFT EYE: Primary | ICD-10-CM

## 2025-07-02 PROCEDURE — 3075F SYST BP GE 130 - 139MM HG: CPT | Performed by: PHYSICIAN ASSISTANT

## 2025-07-02 PROCEDURE — 3079F DIAST BP 80-89 MM HG: CPT | Performed by: PHYSICIAN ASSISTANT

## 2025-07-02 PROCEDURE — 99203 OFFICE O/P NEW LOW 30 MIN: CPT | Performed by: PHYSICIAN ASSISTANT

## 2025-07-02 ASSESSMENT — ENCOUNTER SYMPTOMS
GASTROINTESTINAL NEGATIVE: 1
CHILLS: 0
EYE PAIN: 1
WHEEZING: 0
PALPITATIONS: 0
EYE REDNESS: 1
ALLERGIC/IMMUNOLOGIC NEGATIVE: 1
HEMATURIA: 0
MUSCULOSKELETAL NEGATIVE: 1
RESPIRATORY NEGATIVE: 1
NAUSEA: 0
CHEST TIGHTNESS: 0
DYSURIA: 0
VOMITING: 0
FREQUENCY: 0
HEADACHES: 0
COUGH: 0
SHORTNESS OF BREATH: 0
DIARRHEA: 0
ABDOMINAL PAIN: 0
NEUROLOGICAL NEGATIVE: 1
FEVER: 0
CARDIOVASCULAR NEGATIVE: 1
CONSTITUTIONAL NEGATIVE: 1
MYALGIAS: 0
SORE THROAT: 0

## 2025-07-02 NOTE — PROGRESS NOTES
"Urgent Care Clinic Visit    Chief Complaint   Patient presents with    Eye Problem     Left eye has been bothering him since last night, especially when putting in his contacts.  He noticed a \"little dot\" on the left eye to left side of the eye.  Pt works around a lot of dust.                   7/2/2025     5:15 PM   Additional Questions   Roomed by Vianey GALLOWAY"

## 2025-07-02 NOTE — PROGRESS NOTES
"Chief Complaint:      Chief Complaint   Patient presents with    Eye Problem     Left eye has been bothering him since last night, especially when putting in his contacts.  He noticed a \"little dot\" on the left eye to left side of the eye.  Pt works around a lot of dust.     Medical Decision Making:    Differential Diagnosis:  Foreign body, corneal ulcer, conjunctivitis      ASSESSMENT  1. Corneal ulcer of left eye    The patient presented for a (L) eye pain since last night. He does wear contact lenses. Jeffery does work with dust around but denies remembering anything getting into his eye. He denies vision changes. On exam there is a visible round discoloration at 2 o'clock of his left eye's iris. It is consistent with a clinical picture of an eye ulceration.      PLAN  The patient was advised to report to ophthalmology or optometry for further treatment with eye ABX and steroids.    The patient verbalized understanding and agreed with this plan.    Labs:  No results found for any visits on 07/02/25.       Current Meds    Current Outpatient Medications:     fluticasone (FLONASE) 50 MCG/ACT spray, Spray 1-2 sprays into both nostrils daily (Patient not taking: Reported on 7/2/2025), Disp: 1 Bottle, Rfl: 11    Allergies  No Known Allergies      SUBJECTIVE    HPI: Jeffery Wood is a 44 year old male presenting with left eye pain, redness, possible foreign body  for the past 1 day.  There has not been exposure to pink eye.  There has not been trauma to the eye. He is exposed to dust at work but does not recall any moment where something got into his eye.Jeffery Wood does wear contact lenses.    No problems with vision, or eye pain.     No recent viral illness or seasonal allergies.    Patient is new to [facility].      ROS:     Review of Systems   Constitutional: Negative.  Negative for chills and fever.   HENT: Negative.  Negative for sore throat.    Eyes:  Positive for pain and redness.   Respiratory: " "Negative.  Negative for cough, chest tightness, shortness of breath and wheezing.    Cardiovascular: Negative.  Negative for chest pain and palpitations.   Gastrointestinal: Negative.  Negative for abdominal pain, diarrhea, nausea and vomiting.   Genitourinary:  Negative for dysuria, frequency, hematuria and urgency.   Musculoskeletal: Negative.  Negative for myalgias.   Skin:  Negative for rash.   Allergic/Immunologic: Negative.  Negative for immunocompromised state.   Neurological: Negative.  Negative for headaches.           Family History   Family History   Problem Relation Age of Onset    Migraines Mother     Migraines Sister         Problem history  There is no problem list on file for this patient.          Social History  Social History     Socioeconomic History    Marital status:      Spouse name: Not on file    Number of children: Not on file    Years of education: Not on file    Highest education level: Not on file   Occupational History    Not on file   Tobacco Use    Smoking status: Every Day     Current packs/day: 2.00     Average packs/day: 2.0 packs/day for 7.0 years (14.0 ttl pk-yrs)     Types: Cigarettes    Smokeless tobacco: Never    Tobacco comments:     e-cig   Substance and Sexual Activity    Alcohol use: No    Drug use: No    Sexual activity: Yes     Partners: Female   Other Topics Concern    Parent/sibling w/ CABG, MI or angioplasty before 65F 55M? Not Asked   Social History Narrative    Not on file     Social Drivers of Health     Financial Resource Strain: Not on file   Food Insecurity: Not on file   Transportation Needs: Not on file   Physical Activity: Not on file   Stress: Not on file   Social Connections: Not on file   Interpersonal Safety: Not on file   Housing Stability: Not on file        OBJECTIVE     Vital signs reviewed by Ayush Rangel PA-C  /83   Pulse 88   Temp 98.7  F (37.1  C) (Tympanic)   Resp 14   Ht 1.905 m (6' 3\")   Wt 111.6 kg (246 lb)   SpO2 99%   " BMI 30.75 kg/m       Physical Exam  Vitals and nursing note reviewed.   Constitutional:       General: He is not in acute distress.     Appearance: He is well-developed. He is not ill-appearing, toxic-appearing or diaphoretic.   HENT:      Head: Normocephalic and atraumatic.      Right Ear: Hearing, tympanic membrane, ear canal and external ear normal. Tympanic membrane is not perforated, erythematous, retracted or bulging.      Left Ear: Hearing, tympanic membrane, ear canal and external ear normal. Tympanic membrane is not perforated, erythematous, retracted or bulging.      Nose: Nose normal. No mucosal edema, congestion or rhinorrhea.      Mouth/Throat:      Pharynx: No oropharyngeal exudate or posterior oropharyngeal erythema.      Tonsils: No tonsillar exudate or tonsillar abscesses. 0 on the right. 0 on the left.   Eyes:      General:         Right eye: No foreign body.         Left eye: No foreign body, discharge or hordeolum.      Extraocular Movements:      Left eye: Normal extraocular motion.      Conjunctiva/sclera:      Left eye: Left conjunctiva is injected. No exudate or hemorrhage.     Pupils: Pupils are equal, round, and reactive to light.        Comments: Ulceration.   Cardiovascular:      Rate and Rhythm: Normal rate and regular rhythm.      Heart sounds: Normal heart sounds, S1 normal and S2 normal. Heart sounds not distant. No murmur heard.     No friction rub. No gallop.   Pulmonary:      Effort: Pulmonary effort is normal. No respiratory distress.      Breath sounds: Normal breath sounds. No decreased breath sounds, wheezing, rhonchi or rales.   Abdominal:      General: Bowel sounds are normal. There is no distension.      Palpations: Abdomen is soft.      Tenderness: There is no abdominal tenderness.   Musculoskeletal:      Cervical back: Normal range of motion and neck supple.   Lymphadenopathy:      Cervical: No cervical adenopathy.   Skin:     General: Skin is warm and dry.      Findings:  No rash.   Neurological:      Mental Status: He is alert.      Cranial Nerves: No cranial nerve deficit.   Psychiatric:         Attention and Perception: He is attentive.         Speech: Speech normal.         Behavior: Behavior normal. Behavior is cooperative.         Thought Content: Thought content normal.         Judgment: Judgment normal.            Ayush Rangel PA-C  7/2/2025, 5:18 PM

## 2025-07-02 NOTE — TELEPHONE ENCOUNTER
Patient and spouse called the clinic today. Patient was just seen in the UC for an eye concern. According to patients spouse He was told her has a corneal ulcer and that it is a medical emergency but that a UC or ED provider can't do anything, he has to see an eye doctor.     At the time of this note, the OV notes had not been completed so RN is not sure what all was explained or addressed.     Patient is having pain in his left eye, irritation, he stated it feels like there is an issue with his contact. Redness, can see a white spot on the cornea. He has increased tears and it feels like his eyes are dry. No vision changes. Only clear drainage from the eye.   He asked why they can't give him something for his eye. RN explained these are conditions that are usually handled by eye doctors/providers. They know exactly which abx and steroid work best for this situation.   Per patient he was told if things worsen to go to the ED but was not given detailed s/s to watch for.   Tried to find out if [facility] urgent care was still open but it has closed.   RN reviewed in detail the s/s to watch for and when to go to the ED.   Patient understands.   Monique Russell RN on 7/2/2025 at 5:58 PM